# Patient Record
Sex: MALE | Race: WHITE | URBAN - METROPOLITAN AREA
[De-identification: names, ages, dates, MRNs, and addresses within clinical notes are randomized per-mention and may not be internally consistent; named-entity substitution may affect disease eponyms.]

---

## 2022-03-18 ENCOUNTER — WEB ENCOUNTER (OUTPATIENT)
Dept: URBAN - METROPOLITAN AREA CLINIC 23 | Facility: CLINIC | Age: 32
End: 2022-03-18

## 2022-03-18 ENCOUNTER — DASHBOARD ENCOUNTERS (OUTPATIENT)
Age: 32
End: 2022-03-18

## 2022-03-18 ENCOUNTER — OFFICE VISIT (OUTPATIENT)
Dept: URBAN - METROPOLITAN AREA CLINIC 23 | Facility: CLINIC | Age: 32
End: 2022-03-18
Payer: COMMERCIAL

## 2022-03-18 DIAGNOSIS — R14.3 EXCESSIVE GAS: ICD-10-CM

## 2022-03-18 DIAGNOSIS — R11.2 NAUSEA AND VOMITING, UNSPECIFIED VOMITING TYPE: ICD-10-CM

## 2022-03-18 DIAGNOSIS — R14.0 ABDOMINAL BLOATING: ICD-10-CM

## 2022-03-18 DIAGNOSIS — R14.2 BURPING: ICD-10-CM

## 2022-03-18 PROCEDURE — G9622 NO UNHEAL ETOH USER: HCPCS | Performed by: INTERNAL MEDICINE

## 2022-03-18 PROCEDURE — G8420 CALC BMI NORM PARAMETERS: HCPCS | Performed by: INTERNAL MEDICINE

## 2022-03-18 PROCEDURE — 1036F TOBACCO NON-USER: CPT | Performed by: INTERNAL MEDICINE

## 2022-03-18 PROCEDURE — G8427 DOCREV CUR MEDS BY ELIG CLIN: HCPCS | Performed by: INTERNAL MEDICINE

## 2022-03-18 PROCEDURE — 3017F COLORECTAL CA SCREEN DOC REV: CPT | Performed by: INTERNAL MEDICINE

## 2022-03-18 PROCEDURE — 99204 OFFICE O/P NEW MOD 45 MIN: CPT | Performed by: INTERNAL MEDICINE

## 2022-03-18 RX ORDER — PANTOPRAZOLE SODIUM 40 MG/1
1 TABLET TABLET, DELAYED RELEASE ORAL
Qty: 90 TABLET | Refills: 0 | OUTPATIENT
Start: 2022-03-18

## 2022-03-18 NOTE — HPI-TODAY'S VISIT:
32-year-old male presents with 1 week history of nausea vomiting.  He had a 5-year visit at [facility] x3 and [facility] x2.  He was prescribed with promethazine, Zofran, tramadol.  Denies abdominal pain except for muscle straining after retching.  He is not able to hold food is down.  Each time after ER visit he was discharged.  He also reports bloating, gas, burping.  Denies NSAID use.  No constipation or diarrhea.  Sounds like he had a CT scan done, unremarkable. Sick contact in household, wife and baby. MJ use daily.

## 2022-03-18 NOTE — PREVIOUS WORKUP REVIEWED
.ENDOSCOPIESLABS-Labs 3/15/2022: WBC 11.6, hemoglobin 15.5, platelet 349, total bilirubin 1.5 H, alkaline phosphatase 57, AST 17, ALT 16, creatinine 1.0, sodium 133, potassium 3.2, lipase 30, marijuana positive.-Labs 3/12/2022: WBC 14.7, hemoglobin 15.9, platelet 287, sodium 134, potassium 3.0, BUN 20, creatinine 1.2, total bilirubin 0.9, alkaline phosphatase 55, AST 29, ALT 23, total protein 7.7, albumin 4.8, lipase 20.-Labs 3/10/2022: WBC 11.6, hemoglobin 15.6, platelet 254, sodium 135, potassium 3.4, BUN 18, creatinine 1.1, total bilirubin 1.2, alkaline phosphatase 62, AST 19, ALT 21, lipase 10. IMAGES-CT abdomen pelvis with contrast 3/10/2022: Normal.

## 2022-03-19 ENCOUNTER — TELEPHONE ENCOUNTER (OUTPATIENT)
Dept: URBAN - METROPOLITAN AREA CLINIC 92 | Facility: CLINIC | Age: 32
End: 2022-03-19

## 2022-03-21 LAB
H PYLORI BREATH TEST: NEGATIVE
H. PYLORI BREATH COLLECTION: (no result)

## 2022-03-22 ENCOUNTER — OFFICE VISIT (OUTPATIENT)
Dept: URBAN - METROPOLITAN AREA CLINIC 98 | Facility: CLINIC | Age: 32
End: 2022-03-22

## 2022-03-24 ENCOUNTER — TELEPHONE ENCOUNTER (OUTPATIENT)
Dept: URBAN - METROPOLITAN AREA CLINIC 6 | Facility: CLINIC | Age: 32
End: 2022-03-24

## 2022-04-01 ENCOUNTER — TELEPHONE ENCOUNTER (OUTPATIENT)
Dept: URBAN - METROPOLITAN AREA CLINIC 77 | Facility: CLINIC | Age: 32
End: 2022-04-01